# Patient Record
Sex: MALE | Race: WHITE | NOT HISPANIC OR LATINO | Employment: FULL TIME | URBAN - NONMETROPOLITAN AREA
[De-identification: names, ages, dates, MRNs, and addresses within clinical notes are randomized per-mention and may not be internally consistent; named-entity substitution may affect disease eponyms.]

---

## 2023-11-02 ENCOUNTER — APPOINTMENT (OUTPATIENT)
Dept: RADIOLOGY | Facility: HOSPITAL | Age: 63
End: 2023-11-02
Payer: COMMERCIAL

## 2023-11-02 ENCOUNTER — LAB (OUTPATIENT)
Dept: LAB | Facility: LAB | Age: 63
End: 2023-11-02
Payer: COMMERCIAL

## 2023-11-02 ENCOUNTER — HOSPITAL ENCOUNTER (EMERGENCY)
Facility: HOSPITAL | Age: 63
Discharge: HOME | End: 2023-11-02
Attending: FAMILY MEDICINE
Payer: COMMERCIAL

## 2023-11-02 VITALS
TEMPERATURE: 97.2 F | WEIGHT: 209.88 LBS | HEIGHT: 61 IN | SYSTOLIC BLOOD PRESSURE: 137 MMHG | BODY MASS INDEX: 39.63 KG/M2 | OXYGEN SATURATION: 99 % | HEART RATE: 65 BPM | DIASTOLIC BLOOD PRESSURE: 74 MMHG | RESPIRATION RATE: 14 BRPM

## 2023-11-02 DIAGNOSIS — R07.9 CHEST PAIN, UNSPECIFIED TYPE: Primary | ICD-10-CM

## 2023-11-02 DIAGNOSIS — R10.9 ABDOMINAL PAIN, UNSPECIFIED ABDOMINAL LOCATION: ICD-10-CM

## 2023-11-02 DIAGNOSIS — R10.13 EPIGASTRIC PAIN: Primary | ICD-10-CM

## 2023-11-02 DIAGNOSIS — R91.8 LUNG INFILTRATE: ICD-10-CM

## 2023-11-02 DIAGNOSIS — K85.90 ACUTE PANCREATITIS WITHOUT NECROSIS OR INFECTION, UNSPECIFIED (HHS-HCC): ICD-10-CM

## 2023-11-02 DIAGNOSIS — M54.14 RADICULOPATHY, THORACIC REGION: ICD-10-CM

## 2023-11-02 LAB
ALBUMIN SERPL BCP-MCNC: 3.9 G/DL (ref 3.4–5)
ALP SERPL-CCNC: 68 U/L (ref 33–136)
ALT SERPL W P-5'-P-CCNC: 15 U/L (ref 10–52)
AMYLASE SERPL-CCNC: 47 U/L (ref 29–103)
ANION GAP SERPL CALC-SCNC: 12 MMOL/L (ref 10–20)
AST SERPL W P-5'-P-CCNC: 19 U/L (ref 9–39)
BASOPHILS # BLD AUTO: 0.05 X10*3/UL (ref 0–0.1)
BASOPHILS # BLD AUTO: 0.05 X10*3/UL (ref 0–0.1)
BASOPHILS NFR BLD AUTO: 1 %
BASOPHILS NFR BLD AUTO: 1.1 %
BILIRUB SERPL-MCNC: 0.4 MG/DL (ref 0–1.2)
BUN SERPL-MCNC: 15 MG/DL (ref 6–23)
CALCIUM SERPL-MCNC: 9 MG/DL (ref 8.6–10.3)
CARDIAC TROPONIN I PNL SERPL HS: 3 NG/L (ref 0–20)
CARDIAC TROPONIN I PNL SERPL HS: 3 NG/L (ref 0–20)
CHLORIDE SERPL-SCNC: 103 MMOL/L (ref 98–107)
CO2 SERPL-SCNC: 28 MMOL/L (ref 21–32)
CREAT SERPL-MCNC: 0.79 MG/DL (ref 0.5–1.3)
CRP SERPL-MCNC: <0.1 MG/DL
EOSINOPHIL # BLD AUTO: 0.17 X10*3/UL (ref 0–0.7)
EOSINOPHIL # BLD AUTO: 0.25 X10*3/UL (ref 0–0.7)
EOSINOPHIL NFR BLD AUTO: 3.9 %
EOSINOPHIL NFR BLD AUTO: 5.2 %
ERYTHROCYTE [DISTWIDTH] IN BLOOD BY AUTOMATED COUNT: 12.6 % (ref 11.5–14.5)
ERYTHROCYTE [DISTWIDTH] IN BLOOD BY AUTOMATED COUNT: 12.7 % (ref 11.5–14.5)
ERYTHROCYTE [SEDIMENTATION RATE] IN BLOOD BY WESTERGREN METHOD: 3 MM/H (ref 0–20)
GFR SERPL CREATININE-BSD FRML MDRD: >90 ML/MIN/1.73M*2
GLUCOSE SERPL-MCNC: 105 MG/DL (ref 74–99)
HCT VFR BLD AUTO: 41.6 % (ref 41–52)
HCT VFR BLD AUTO: 43.9 % (ref 41–52)
HGB BLD-MCNC: 13.6 G/DL (ref 13.5–17.5)
HGB BLD-MCNC: 14.2 G/DL (ref 13.5–17.5)
IMM GRANULOCYTES # BLD AUTO: 0.01 X10*3/UL (ref 0–0.7)
IMM GRANULOCYTES # BLD AUTO: 0.01 X10*3/UL (ref 0–0.7)
IMM GRANULOCYTES NFR BLD AUTO: 0.2 % (ref 0–0.9)
IMM GRANULOCYTES NFR BLD AUTO: 0.2 % (ref 0–0.9)
LIPASE SERPL-CCNC: 20 U/L (ref 9–82)
LYMPHOCYTES # BLD AUTO: 0.86 X10*3/UL (ref 1.2–4.8)
LYMPHOCYTES # BLD AUTO: 1.16 X10*3/UL (ref 1.2–4.8)
LYMPHOCYTES NFR BLD AUTO: 19.6 %
LYMPHOCYTES NFR BLD AUTO: 23.9 %
MAGNESIUM SERPL-MCNC: 2.18 MG/DL (ref 1.6–2.4)
MCH RBC QN AUTO: 31.5 PG (ref 26–34)
MCH RBC QN AUTO: 31.6 PG (ref 26–34)
MCHC RBC AUTO-ENTMCNC: 32.3 G/DL (ref 32–36)
MCHC RBC AUTO-ENTMCNC: 32.7 G/DL (ref 32–36)
MCV RBC AUTO: 96 FL (ref 80–100)
MCV RBC AUTO: 98 FL (ref 80–100)
MONOCYTES # BLD AUTO: 0.69 X10*3/UL (ref 0.1–1)
MONOCYTES # BLD AUTO: 0.74 X10*3/UL (ref 0.1–1)
MONOCYTES NFR BLD AUTO: 14.2 %
MONOCYTES NFR BLD AUTO: 16.9 %
NEUTROPHILS # BLD AUTO: 2.56 X10*3/UL (ref 1.2–7.7)
NEUTROPHILS # BLD AUTO: 2.69 X10*3/UL (ref 1.2–7.7)
NEUTROPHILS NFR BLD AUTO: 55.5 %
NEUTROPHILS NFR BLD AUTO: 58.3 %
NRBC BLD-RTO: 0 /100 WBCS (ref 0–0)
NRBC BLD-RTO: 0 /100 WBCS (ref 0–0)
PLATELET # BLD AUTO: 223 X10*3/UL (ref 150–450)
PLATELET # BLD AUTO: 239 X10*3/UL (ref 150–450)
POTASSIUM SERPL-SCNC: 4.1 MMOL/L (ref 3.5–5.3)
PROT SERPL-MCNC: 6.6 G/DL (ref 6.4–8.2)
RBC # BLD AUTO: 4.32 X10*6/UL (ref 4.5–5.9)
RBC # BLD AUTO: 4.5 X10*6/UL (ref 4.5–5.9)
SODIUM SERPL-SCNC: 139 MMOL/L (ref 136–145)
WBC # BLD AUTO: 4.4 X10*3/UL (ref 4.4–11.3)
WBC # BLD AUTO: 4.9 X10*3/UL (ref 4.4–11.3)

## 2023-11-02 PROCEDURE — 82150 ASSAY OF AMYLASE: CPT

## 2023-11-02 PROCEDURE — 71275 CT ANGIOGRAPHY CHEST: CPT

## 2023-11-02 PROCEDURE — 96375 TX/PRO/DX INJ NEW DRUG ADDON: CPT

## 2023-11-02 PROCEDURE — 96365 THER/PROPH/DIAG IV INF INIT: CPT

## 2023-11-02 PROCEDURE — 84484 ASSAY OF TROPONIN QUANT: CPT | Performed by: FAMILY MEDICINE

## 2023-11-02 PROCEDURE — 71275 CT ANGIOGRAPHY CHEST: CPT | Performed by: SURGERY

## 2023-11-02 PROCEDURE — 87040 BLOOD CULTURE FOR BACTERIA: CPT | Mod: GENLAB | Performed by: FAMILY MEDICINE

## 2023-11-02 PROCEDURE — 74177 CT ABD & PELVIS W/CONTRAST: CPT | Performed by: SURGERY

## 2023-11-02 PROCEDURE — 83690 ASSAY OF LIPASE: CPT

## 2023-11-02 PROCEDURE — C9113 INJ PANTOPRAZOLE SODIUM, VIA: HCPCS | Performed by: FAMILY MEDICINE

## 2023-11-02 PROCEDURE — 85652 RBC SED RATE AUTOMATED: CPT

## 2023-11-02 PROCEDURE — 74177 CT ABD & PELVIS W/CONTRAST: CPT

## 2023-11-02 PROCEDURE — 85025 COMPLETE CBC W/AUTO DIFF WBC: CPT

## 2023-11-02 PROCEDURE — 36415 COLL VENOUS BLD VENIPUNCTURE: CPT | Performed by: FAMILY MEDICINE

## 2023-11-02 PROCEDURE — 80053 COMPREHEN METABOLIC PANEL: CPT | Performed by: FAMILY MEDICINE

## 2023-11-02 PROCEDURE — 86140 C-REACTIVE PROTEIN: CPT

## 2023-11-02 PROCEDURE — 99285 EMERGENCY DEPT VISIT HI MDM: CPT | Mod: 25 | Performed by: FAMILY MEDICINE

## 2023-11-02 PROCEDURE — 85025 COMPLETE CBC W/AUTO DIFF WBC: CPT | Performed by: FAMILY MEDICINE

## 2023-11-02 PROCEDURE — 2550000001 HC RX 255 CONTRASTS: Performed by: FAMILY MEDICINE

## 2023-11-02 PROCEDURE — 2500000001 HC RX 250 WO HCPCS SELF ADMINISTERED DRUGS (ALT 637 FOR MEDICARE OP): Performed by: FAMILY MEDICINE

## 2023-11-02 PROCEDURE — 99285 EMERGENCY DEPT VISIT HI MDM: CPT | Mod: 25

## 2023-11-02 PROCEDURE — 87075 CULTR BACTERIA EXCEPT BLOOD: CPT | Mod: GENLAB | Performed by: FAMILY MEDICINE

## 2023-11-02 PROCEDURE — 83735 ASSAY OF MAGNESIUM: CPT | Performed by: FAMILY MEDICINE

## 2023-11-02 PROCEDURE — 2500000004 HC RX 250 GENERAL PHARMACY W/ HCPCS (ALT 636 FOR OP/ED): Performed by: FAMILY MEDICINE

## 2023-11-02 RX ORDER — ALUMINUM HYDROXIDE, MAGNESIUM HYDROXIDE, AND SIMETHICONE 1200; 120; 1200 MG/30ML; MG/30ML; MG/30ML
30 SUSPENSION ORAL ONCE
Status: COMPLETED | OUTPATIENT
Start: 2023-11-02 | End: 2023-11-02

## 2023-11-02 RX ORDER — AMOXICILLIN AND CLAVULANATE POTASSIUM 875; 125 MG/1; MG/1
1 TABLET, FILM COATED ORAL EVERY 12 HOURS SCHEDULED
Status: DISCONTINUED | OUTPATIENT
Start: 2023-11-02 | End: 2023-11-02 | Stop reason: HOSPADM

## 2023-11-02 RX ORDER — PANTOPRAZOLE SODIUM 40 MG/1
40 TABLET, DELAYED RELEASE ORAL
Qty: 30 TABLET | Refills: 0 | Status: SHIPPED | OUTPATIENT
Start: 2023-11-02 | End: 2024-11-01

## 2023-11-02 RX ORDER — LIDOCAINE HYDROCHLORIDE 20 MG/ML
1.25 SOLUTION OROPHARYNGEAL ONCE
Status: COMPLETED | OUTPATIENT
Start: 2023-11-02 | End: 2023-11-02

## 2023-11-02 RX ORDER — AMOXICILLIN AND CLAVULANATE POTASSIUM 875; 125 MG/1; MG/1
1 TABLET, FILM COATED ORAL ONCE
Status: DISCONTINUED | OUTPATIENT
Start: 2023-11-02 | End: 2023-11-02

## 2023-11-02 RX ORDER — PANTOPRAZOLE SODIUM 40 MG/10ML
40 INJECTION, POWDER, LYOPHILIZED, FOR SOLUTION INTRAVENOUS ONCE
Status: COMPLETED | OUTPATIENT
Start: 2023-11-02 | End: 2023-11-02

## 2023-11-02 RX ORDER — AMOXICILLIN AND CLAVULANATE POTASSIUM 875; 125 MG/1; MG/1
1 TABLET, FILM COATED ORAL EVERY 12 HOURS
Qty: 20 TABLET | Refills: 0 | Status: SHIPPED | OUTPATIENT
Start: 2023-11-02 | End: 2023-11-12

## 2023-11-02 RX ORDER — PANTOPRAZOLE SODIUM 40 MG/10ML
40 INJECTION, POWDER, LYOPHILIZED, FOR SOLUTION INTRAVENOUS ONCE
Status: DISCONTINUED | OUTPATIENT
Start: 2023-11-02 | End: 2023-11-02 | Stop reason: HOSPADM

## 2023-11-02 RX ADMIN — PIPERACILLIN SODIUM AND TAZOBACTAM SODIUM 3.38 G: 3; .375 INJECTION, SOLUTION INTRAVENOUS at 04:34

## 2023-11-02 RX ADMIN — IOHEXOL 75 ML: 350 INJECTION, SOLUTION INTRAVENOUS at 03:24

## 2023-11-02 RX ADMIN — PANTOPRAZOLE SODIUM 40 MG: 40 INJECTION, POWDER, FOR SOLUTION INTRAVENOUS at 04:39

## 2023-11-02 RX ADMIN — ALUMINUM HYDROXIDE, MAGNESIUM HYDROXIDE, AND SIMETHICONE 30 ML: 200; 200; 20 SUSPENSION ORAL at 05:18

## 2023-11-02 RX ADMIN — LIDOCAINE HYDROCHLORIDE 1.25 ML: 20 SOLUTION ORAL; TOPICAL at 04:40

## 2023-11-02 ASSESSMENT — PAIN SCALES - GENERAL
PAINLEVEL_OUTOF10: 4
PAINLEVEL_OUTOF10: 4

## 2023-11-02 ASSESSMENT — COLUMBIA-SUICIDE SEVERITY RATING SCALE - C-SSRS
6. HAVE YOU EVER DONE ANYTHING, STARTED TO DO ANYTHING, OR PREPARED TO DO ANYTHING TO END YOUR LIFE?: NO
2. HAVE YOU ACTUALLY HAD ANY THOUGHTS OF KILLING YOURSELF?: NO
1. IN THE PAST MONTH, HAVE YOU WISHED YOU WERE DEAD OR WISHED YOU COULD GO TO SLEEP AND NOT WAKE UP?: NO

## 2023-11-02 ASSESSMENT — PAIN - FUNCTIONAL ASSESSMENT: PAIN_FUNCTIONAL_ASSESSMENT: 0-10

## 2023-11-02 ASSESSMENT — PAIN DESCRIPTION - PAIN TYPE: TYPE: ACUTE PAIN

## 2023-11-02 ASSESSMENT — PAIN DESCRIPTION - LOCATION: LOCATION: CHEST

## 2023-11-02 ASSESSMENT — PAIN DESCRIPTION - DESCRIPTORS: DESCRIPTORS: PRESSURE

## 2023-11-02 NOTE — ED NOTES
Discharge instructions and follow up information provided to pt, given work note, prescriptions send to [facility] in [place], IV removed, ambulatory out of ED

## 2023-11-02 NOTE — ED NOTES
Repeat EKG and trop done on pt, pt has no needs at this time, call light is within reach and family is at bedside     Harini Giraldo RN  11/02/23 [id number]

## 2023-11-02 NOTE — ED PROVIDER NOTES
HPI   Chief Complaint   Patient presents with   • Chest Pain     Pt to ED c/o midsternal chest pain 4/10 that radiates to left arm since, worsening since Sunday       HPI  63-year-old male patient history of rectal cancer diagnosed about 2 years ago is cancer free and he has no colostomy tube exit site experiencing epigastric pain but he also complaining of substernal and above the breast area pain and some numbness in axillary area and decided come to ER for evaluation symptoms started 4 days ago.  He denies any course or dizzy and lightheaded or palpitation.  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  He denies any vomiting vomiting blood blood in stool or black stool.  He denies any pain in arms or jaw.  Denies any cold sweats.  Denies any pain or swelling legs, recent travel surgery or hospitalization.  He has never been scoped and denies history of peptic ulcer disease.  He still have gallbladder and appendix.  Denies UTI symptoms.  Denies personal family history of AAA or dissection.  Patient also denies personal history of coronary artery disease.  Denies weight loss weight gain or night sweats he is being evaluated and advised by his cancer specialist scheduled to have surveillance CT of the abdomen pelvis in December.       REVIEW OF SYSTEMS    CONSTITUTIONAL: Negative for: chills and fever  EYES: Negative for: pain   ENMTEars: Negative for: hearing disturbance and tinnitus  Nose: Negative for: congestion and nose bleeds  CARDIOVASCULAR: Chest pain and epigastric pain,no chest tightness cough wheezing congestion reported.)   Negative for: bradycardia, chest pain, diaphoresis, edema, irregular rhythm and palpitations  RESPIRATORY: Negative for: cough, dyspnea and wheezing  GASTROINTESTINAL: Epigastric pain no nausea, vomiting vomiting blood blood in stool or black stool also complaining of chest pain.      GENITOURINARY:  Negative for: dysuria, frequency, hematuria and strong smelling urine; MUSCULOSKELETAL: Negative for: back pain, neck pain, sensory deficits and stiffness  NEUROLOGICAL: Negative for: altered mental status, dizziness, headache, loss of consciousness, loss of function and low extremity numbness; memory impairment, neck stiffness, sensory deficits, upper extremity numbness and vertigo  PSYCHIATRIC: Negative for: anxiety, depression and hallucinations    HEME/LYMPH: Negative for: anemia and night sweats         PHYSICAL EXAM    CONSTITUTIONAL: Patient is awake alert pleasant and cooperative did not look toxic in distress.. Breathing comfortably oriented times three. Patient noted a pulse ox in the mid to high 90s after going out to, heart rate in the 60s.  No friction rub no murmur no JVD.  No peripheral edema.     HENMT: The airway was intact. There was no ear or nose discharge. No drooling or stridor. Neck was supple. Talking and breathing comfortably.      EYES: Clear bilaterally, pupils equal, round and reactive to light.     CARDIOVASCULAR: Regular rate and rhythm. No friction rub or murmur good peripheral pulses no peripheral edema. Nontender Homans sign negative.     RESPIRATORY: Patient was breathing comfortably. No tachypnea no respiratory distress.  On auscultation he has diminished breath sounds crackles in the lung bases.  However has good skin perfusion.     GASTROINTESTINAL: Abdomen soft and non-distended, no palpable mass noted,Noted to have some epigastric tenderness,no guarding rebound or rigidity no CVA tenderness noted no organomegaly. Careful examination demonstrated no     GENITOURINARY:  No costovertebral angle tenderness.     MUSCULOSKELETAL: Head was normocephalic atraumatic cervical thoracic lumbar spine nontender.  Chest was nontender  Both upper extremity good motion nontender intact distal pulse intact sensation.     NEUROLOGICAL: Awake alert pleasant and cooperative. No motor or sensory  deficit no arms selective noted. Intact neurovascular function and motor function. No facial drooping or drooling. Talking and breathing comfortably.  Cranial nerves II to XII grossly intact. No arms selective noted. No nystagmus.      SKIN: Skin normal color for race, warm, dry and intact. No evidence of trauma or lesions.     PSYCHIATRIC: Awake alert and without acute distress. No obvious depression, no suicidal thoughts or ideation. Appropriate mood. Talking and normal tone.     HEME/LYMPH: No adenopathy or splenomegaly.        CRITICAL CARE    VITAL SIGNS:       heart rate 60 respiratory 19 blood pressure 152/71 pulse ox 92% on O2 through nasal cannula           DISPOSITION    Due to his symptoms present 4 days ago he decided come to the ER this morning just before 3:00 in the morning.  Patient has a small does not drink denies substance abuse.  He is a schoolteacher.    Review of system: 10 review of system obtained, all negative except as described in HPI               Carolyne Coma Scale Score: 15                  Patient History   Past Medical History:   Diagnosis Date   • Encounter for attention to ileostomy (CMS/HCC) 07/17/2020    Attention to ileostomy   • Other conditions influencing health status     No significant past surgical history   • Other retention of urine 03/11/2020    Acute urinary retention   • Other specified diseases of anus and rectum 07/17/2020    Rectal mass   • Retention of urine, unspecified 03/13/2020    Incomplete bladder emptying     Past Surgical History:   Procedure Laterality Date   • OTHER SURGICAL HISTORY  07/17/2020    Ileostomy closure   • US GUIDED NEEDLE LIVER BIOPSY  10/24/2019    US GUIDED NEEDLE LIVER BIOPSY 10/24/2019 [facility]GAC[facility]     No family history on file.  Social History     Tobacco Use   • Smoking status: Not on file   • Smokeless tobacco: Not on file   Substance Use Topics   • Alcohol use: Not on file   • Drug use: Not on file       Physical Exam   ED Triage  Vitals [11/02/23 0229]   Temp Heart Rate Resp BP   36.2 °C (97.2 °F) 68 14 154/80      SpO2 Temp src Heart Rate Source Patient Position   97 % -- -- --      BP Location FiO2 (%)     -- --       Physical Exam    ED Course & MDM   Diagnoses as of 11/02/23 050   Chest pain, unspecified type   Abdominal pain, unspecified abdominal location   Lung infiltrate       Medical Decision Making      Procedure  Procedures     Viki Mota MD  11/02/23 [id number]

## 2023-11-02 NOTE — DISCHARGE INSTRUCTIONS
As discussed your blood test for the heart attack was negative there was no evidence of blood clot in the lung or lung mass however you do have infiltrate in the lung.  Needs to be treated and make sure it is resolved need to have follow-up to ensure resolution.  There is no acute intra-abdominal abnormality there is anastomosis related finding similar to 1 you had on previous CAT scan that your colon or colorectal cancer needs to be reevaluated but is not what it was seen on prior CAT scan.  You also had epigastric pain and he would benefit from gastroenterology evaluation initially you been referred to cardiologist and primary care physician.  If any recurrent chest pain short of breath new symptoms worsening symptoms return to ER.  Remember that chest PTH does not show evidence of heart attack, coronary artery disease cannot be entirely excluded on blood test and EKG basis.  You need you follow-up with cardiologist for reevaluation.

## 2023-11-02 NOTE — ED NOTES
Pt to ED c/o midsternal chest pain 4/10 that radiates to left arm since, worsening since Sunday, EKG performed, NSR, IV and blood work completed     Harini Giraldo, RN  11/02/23 8508

## 2023-11-06 LAB
BACTERIA BLD CULT: NORMAL
BACTERIA BLD CULT: NORMAL

## 2023-12-05 ENCOUNTER — HOSPITAL ENCOUNTER (OUTPATIENT)
Dept: CARDIOLOGY | Facility: HOSPITAL | Age: 63
Discharge: HOME | End: 2023-12-05
Payer: COMMERCIAL

## 2023-12-05 PROCEDURE — 93005 ELECTROCARDIOGRAM TRACING: CPT

## 2023-12-06 LAB
ATRIAL RATE: 58 BPM
ATRIAL RATE: 70 BPM
P AXIS: 77 DEGREES
P AXIS: 80 DEGREES
P OFFSET: 195 MS
P OFFSET: 195 MS
P ONSET: 127 MS
P ONSET: 128 MS
PR INTERVAL: 190 MS
PR INTERVAL: 192 MS
Q ONSET: 223 MS
Q ONSET: 223 MS
QRS COUNT: 10 BEATS
QRS COUNT: 11 BEATS
QRS DURATION: 88 MS
QRS DURATION: 92 MS
QT INTERVAL: 396 MS
QT INTERVAL: 422 MS
QTC CALCULATION(BAZETT): 414 MS
QTC CALCULATION(BAZETT): 427 MS
QTC FREDERICIA: 417 MS
QTC FREDERICIA: 417 MS
R AXIS: 38 DEGREES
R AXIS: 43 DEGREES
T AXIS: 53 DEGREES
T AXIS: 55 DEGREES
T OFFSET: 421 MS
T OFFSET: 434 MS
VENTRICULAR RATE: 58 BPM
VENTRICULAR RATE: 70 BPM

## 2023-12-20 ENCOUNTER — LAB (OUTPATIENT)
Dept: LAB | Facility: HOSPITAL | Age: 63
End: 2023-12-20
Payer: COMMERCIAL

## 2023-12-20 ENCOUNTER — OFFICE VISIT (OUTPATIENT)
Dept: HEMATOLOGY/ONCOLOGY | Facility: HOSPITAL | Age: 63
End: 2023-12-20
Payer: COMMERCIAL

## 2023-12-20 ENCOUNTER — APPOINTMENT (OUTPATIENT)
Dept: HEMATOLOGY/ONCOLOGY | Facility: HOSPITAL | Age: 63
End: 2023-12-20
Payer: COMMERCIAL

## 2023-12-20 VITALS
WEIGHT: 202.16 LBS | HEIGHT: 70 IN | OXYGEN SATURATION: 100 % | DIASTOLIC BLOOD PRESSURE: 64 MMHG | TEMPERATURE: 97.2 F | RESPIRATION RATE: 18 BRPM | BODY MASS INDEX: 28.94 KG/M2 | SYSTOLIC BLOOD PRESSURE: 138 MMHG | HEART RATE: 70 BPM

## 2023-12-20 DIAGNOSIS — C20 RECTAL CANCER (MULTI): Primary | ICD-10-CM

## 2023-12-20 DIAGNOSIS — C20 RECTAL CANCER (MULTI): ICD-10-CM

## 2023-12-20 LAB
HOLD SPECIMEN: NORMAL
HOLD SPECIMEN: NORMAL

## 2023-12-20 PROCEDURE — 99215 OFFICE O/P EST HI 40 MIN: CPT | Performed by: INTERNAL MEDICINE

## 2023-12-20 PROCEDURE — 1159F MED LIST DOCD IN RCRD: CPT | Performed by: INTERNAL MEDICINE

## 2023-12-20 PROCEDURE — 1126F AMNT PAIN NOTED NONE PRSNT: CPT | Performed by: INTERNAL MEDICINE

## 2023-12-20 PROCEDURE — 36415 COLL VENOUS BLD VENIPUNCTURE: CPT

## 2023-12-20 PROCEDURE — 1036F TOBACCO NON-USER: CPT | Performed by: INTERNAL MEDICINE

## 2023-12-20 ASSESSMENT — PAIN SCALES - GENERAL: PAINLEVEL: 0-NO PAIN

## 2023-12-20 NOTE — PROGRESS NOTES
Patient ID: Jonel Rai is a 63 y.o. male.  Referring Physician: No referring provider defined for this encounter.  Primary Care Provider: Korey Hubbard DO     To summarize his cancer Hx:  He presented to the emergency room because of  difficulty urinating and imaging on 05/22/2019 revealed an apple core circumferential rectal  mass with MRI pelvis on 05/24/2019 confirmed T4a N2 rectal cancer, extending into the sigmoid colon and into the perirectal fat with adenopathy.  05/24/2019  Chest CT showed 5 mm groundglass nodule in the right upper lobe of the lung and no other definitive evidence of metastatic disease. This was followed by Flexible sigmoidoscopy revealed an ulcerated rectal mass and biopsy revealed invasive adenocarcinoma  grade 3, pMMR.  Patient developed abdominal pain and decreased appetite and evaluation revealed obstruction therefore underwent a laparoscopic sigmoid loop colostomy. CEA on 05/2019 was 10.3. The case has been discussed on the tumor board on 06/06/2019  and pt was referred to Med Onc and Rad Onc. After that the pt saw Matt Andrew who repeated MRI on 07/23/2019 showed mid rectal mass extending above and below the anterior peritoneal reflection demonstrating invasion into the seminal vesicles bilaterally.  Dr. Vallejo discussed with the pt the stage and the extent of the disease with agreement to proceed with PATRICK (Total Neoadjuvant therapy) with Chemotherapy using XELOX (oxaliplatin and Xeloda) for 12-16 weeks then followed by concurrent Xeloda and radiation  therapy, and then surgical resection. Pt understudied the plan and he started C1 on 08/06/2019 and tolerated well. He also started ChemoRT on 11/18/2019 and tolerating well. Repeated MRI on 01/2020  for response evaluation showed Significant response to treatment compared to both prior MRI of the pelvis, most recent 10/04/2019, with interval improvement and decrease of the transmural circumferential upper rectal  "mass and the LNS. On 02/27/2020 he  had Robotic low anterior resection with flexible sigmoidoscopy, loop ileostomy, colostomy reversal and Robotic dissection of the seminal vesicles and mobilization of the bladder. Final surgical pathology didn't show any residual carcinoma with final staging  T0N0 with 0/26 LNS were involved.    Subjective    Patient is feeling well. Denies abdominal pain, nausea or vomiting. No new symptoms.        Objective   BSA: 2.13 meters squared  /64 (BP Location: Left arm, Patient Position: Sitting, BP Cuff Size: Adult)   Pulse 70   Temp 36.2 °C (97.2 °F) (Temporal)   Resp 18   Ht (S) 1.786 m (5' 10.32\")   Wt 91.7 kg (202 lb 2.6 oz)   SpO2 100%   BMI 28.75 kg/m²       Jonel Rai  reports that he has never smoked. He has never used smokeless tobacco.  He  has no history on file for alcohol use.  He  has no history on file for drug use.    ROS:  All 14 systems have been reviewed and were negative except for the HPI.     Physical Exam:  General: Appears well and in NAD  Eyes: PERRL, EOMI, clear sclera  ENMT: mucous membranes moist, no apparent injury, no lesions seen  Head/Neck: Neck supple, no apparent injury, thyroid without mass or tenderness, No JVD, trachea midline,   Thorax: Patent airways, CTAB, normal breath sounds with good chest expansion, thorax symmetric  Cardiovascular: Regular, rate and rhythm, no murmurs, 2+ equal pulses of the extremities, normal S 1and S 2  Gastrointestinal: Nondistended, soft, non-tender, no rebound tenderness or guarding, no masses palpable, no organomegaly, +BS  Musculoskeletal: ROM intact, no joint swelling, normal strength  Extremities: normal extremities, no cyanosis edema, contusions or wounds, no clubbing  Neurological: alert and oriented x3, intact senses, motor, response and reflexes, normal strength  Lymphatic: No significant lymphadenopathy  Psychological: Appropriate mood and behavior  Skin: Warm and dry, no lesions, no rashes "     Performance Status:  Asymptomatic    Lab Results:  I have reviewed these laboratory results:     Lab Results   Component Value Date    WBC 4.4 11/02/2023    HGB 14.2 11/02/2023    HCT 43.9 11/02/2023    MCV 98 11/02/2023     11/02/2023         Chemistry    Lab Results   Component Value Date/Time     11/02/2023 0228    K 4.1 11/02/2023 0228     11/02/2023 0228    CO2 28 11/02/2023 0228    BUN 15 11/02/2023 0228    CREATININE 0.79 11/02/2023 0228    Lab Results   Component Value Date/Time    CALCIUM 9.0 11/02/2023 0228    ALKPHOS 68 11/02/2023 0228    AST 19 11/02/2023 0228    ALT 15 11/02/2023 0228    BILITOT 0.4 11/02/2023 0228            Lab Results   Component Value Date    TSH 3.77 01/31/2020        Radiology Result:  I have reviewed the latest Imaging in PACS and the findings are noted in this note. I discussed the results of the latest imaging with the patient. All previous imaging were reviewed at the time it was completed. Full records are available in the EMR for review as well.     === 11/02/23 ===    CT ABDOMEN PELVIS W IV CONTRAST    - Impression -  No evidence of acute abdominal or pelvic pathology.    Moderate to large volume of colonic stool; correlate with  constipation.    Redemonstrated postsurgical changes to the colon, with similar  appearance of abnormal extraluminal density along the left lateral  aspect of the colon proximal to the proximal aspect of the  anastomotic suture line in the pelvis.    Additional findings as discussed above.      MACRO:  None    Signed by: Bernardo Aceves 11/2/2023 3:41 AM  Dictation workstation:   [id number]    === 01/27/20 ===    MRI PELVIS W WO CONTRAST    - Impression -  1. Significant response to treatment compared to both prior MRI of  the pelvis, most recent 10/04/2019, with interval improvement and  decrease of the transmural circumferential upper rectal mass with  however persistent viable tumor in a segment of approximately 3cm  length,  persistent component of extramural invasion and concern for  persistent involvement (at least microscopic) of the peritoneal  reflection.  2. Interval decrease in size and number of at least 4 superior rectal  and perirectal lymphadenopathy compared to 10/04/2019 MRI, compatible  with stage N2.    I personally reviewed the images/study and I agree with the findings  as stated. This study was interpreted at [facility], [place], [place].           Pathology Results:  I have reviewed the full pathology report recorded in the EMR. The pertinent portions indicating diagnosis are listed here in the note. for details please refer to the full report recorded in the EMR.    Assessment and Plan:   Stage IIIC (T4aN2)  rectal cancer   60-year-old man with an initial clinical T4aN2 stage IIIc rectal cancer  S/P PATRICK  PS: ECOG score of 0  CEA was 10.3 on 05/2019 and was trending up to 38 after the colostomy  Treatment up-to-date:   PATRICK (Total Neoadjuvant  therapy) with Chemotherapy using XELOX (oxaliplatin and Xeloda) for 12-16 weeks then followed by concurrent Xeloda and radiation therapy, and then surgical resection. Pt understudied the plan and he started C1 on 08/06/2019 and he tolerated well and had  his last cycle was on 10/2019.   Biopsy of the Liver lesion on the restaging scan was negative.  He also started ChemoRT on 11/18/2019 and finished on 12/2019 tolerating well.  Repeated MRI on 01/2020 for response evaluation showed Significant response to treatment compared to both prior MRI of the pelvis, most recent 10/04/2019, with interval improvement and decrease of the transmural circumferential upper rectal mass and the  LNS. On 02/27/2020 he had Robotic low anterior resection with flexible sigmoidoscopy, loop ileostomy, colostomy reversal and Robotic dissection of the seminal vesicles and mobilization of the bladder. Final surgical pathology didn't show any residual  carcinoma with final  staging T0N0 with 0/26 LNS were involved.  Last CEA is  0.8  CT CAP for surveillance showed no evidence of metastatic or recurrent disease. The lung changes due to his COVID infection   He had his Year 1 colonscopy on 02/26/2020 which was normal   11/02/2023: CT CAP was negative  No CEA or CtDNA      Today :  Pt is feeling well today.   Denies any abdominal pain, no fever, no chills, no nausea, no vomitting. No fatigue. No melena or hematocezia. Appetite is okay   Will resume with annual labs , CEA, CtDNA, CTCAP and colonscopy for another 2 years to complete the 5 years      His surveillance for total of 5 yrs. I will resume with H&P with labs include CEA every 3 mos for 2 yrs then every 6 mos for another 3 yrs to complete total of 5 yrs. Interms of imaging I will proceed with CT CAP every 6 mos for 2 yrs then annually for  another 3 yrs to complete total of 5 yrs. For colonoscopy we will have the plan for 1,3,&5 yrs colonscopy for follow up.                       Plan:    1-Resume surveillance in 12  mos CEA and CT CAP in 12/2023 for another 3 years   2-Will schedule colonoscopy at year 3 in 03/2023  3-RTC in 12 months                 Jonel MONTERROSO Cecelia ,  It was a pleasure talking to you today.    Our offices will be reaching out to you to make all the appointments. I am always available at the phone numbers listed below for an earlier appointment should you wish one.    In case of an emergency please dial 911 or report to your nearest Emergency Room.  For all other questions, please do not hesitate to reach out to us at the number listed below.    Thank you for choosing [facility] at [facility].   We appreciate your visit.       Ron Robertson M.D.   and Director of the Neuroendocrine Tumor Program  Gastrointestinal Medical Oncology  Department of Hematology and Oncology  [facility], [address],  [place] [id number]  Phone (Office): [phone]  Fax:  [phone]   Email: [email or web address]  Learn more about [facility] Comprehensive Neuroendocrine Tumor Program: Click Here  Learn more about [place] [employer]end[employer]: [email or web address]

## 2023-12-20 NOTE — PROGRESS NOTES
Patient here with wife. Will return in 1 yr for follow up. Signatera done today in lab, will repeat in 1 yr.

## 2024-10-25 ENCOUNTER — OFFICE VISIT (OUTPATIENT)
Dept: URGENT CARE | Facility: URGENT CARE | Age: 64
End: 2024-10-25
Payer: COMMERCIAL

## 2024-10-25 VITALS
HEART RATE: 73 BPM | BODY MASS INDEX: 29.06 KG/M2 | TEMPERATURE: 98.3 F | SYSTOLIC BLOOD PRESSURE: 131 MMHG | OXYGEN SATURATION: 95 % | RESPIRATION RATE: 18 BRPM | WEIGHT: 204.37 LBS | DIASTOLIC BLOOD PRESSURE: 81 MMHG

## 2024-10-25 DIAGNOSIS — L30.9 DERMATITIS: ICD-10-CM

## 2024-10-25 DIAGNOSIS — S30.860A TICK BITE OF LOWER BACK, INITIAL ENCOUNTER: Primary | ICD-10-CM
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
## 2024-10-25 RX ORDER — CLOTRIMAZOLE AND BETAMETHASONE DIPROPIONATE 10; .64 MG/G; MG/G
1 CREAM TOPICAL 2 TIMES DAILY
Qty: 15 G | Refills: 0 | Status: SHIPPED | OUTPATIENT
Start: 2024-10-25 | End: 2024-11-01

## 2024-10-25 RX ORDER — DOXYCYCLINE HYCLATE 100 MG
100 TABLET ORAL 2 TIMES DAILY
Qty: 20 TABLET | Refills: 0 | Status: SHIPPED | OUTPATIENT
Start: 2024-10-25 | End: 2024-11-04

## 2024-10-25 ASSESSMENT — PAIN SCALES - GENERAL: PAINLEVEL_OUTOF10: 0-NO PAIN

## 2024-10-25 NOTE — PATIENT INSTRUCTIONS
Tick Bite- Apply bacitracin ointment to tick bite twice a day x 3-5 days, may take tylenol as needed for pain. May contact local health department to send tick for testing of lyme disease.    Dermatitis with tinea infection- start Clotrimazole-betamethasone cream to right ankle and upper abdomen twice a day x 7 days, if not improving follow up with PCP for further workup. Return if worsening.

## 2024-10-25 NOTE — PROGRESS NOTES
Subjective   Patient ID: Jonel Rai is a 64 y.o. male. They present today with a chief complaint of Other (Pt. C/O tick bite to left side of abd-redness./Happened this AM.).    History of Present Illness  HPI  Pt presents with medium size tick in envelope, slightly engorged, removed from left flank. He reports showering last night and not noticing anything. However today felt a prick and redness with tick embedded in skin. [place] and has been raking leaves and has outdoor dog. Pt denies any fever.     Pt also concerned for itchy rash to right medial ankle, concerned for fungal infection. He has not tried any otc meds. He also reports hx of shingles on left upper abdomen a year ago and since has persistent itching sensation there with no rash. 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     Past Medical History  Allergies as of 10/25/2024    (No Known Allergies)       (Not in a hospital admission)       Past Medical History:   Diagnosis Date    Encounter for attention to ileostomy (Multi) 07/17/2020    Attention to ileostomy    Other conditions influencing health status     No significant past surgical history    Other retention of urine 03/11/2020    Acute urinary retention    Other specified diseases of anus and rectum 07/17/2020    Rectal mass    Retention of urine, unspecified 03/13/2020    Incomplete bladder emptying       Past Surgical History:   Procedure Laterality Date    OTHER SURGICAL HISTORY  07/17/2020    Ileostomy closure    US GUIDED NEEDLE LIVER BIOPSY  10/24/2019    US GUIDED NEEDLE LIVER BIOPSY [place]24[place]2019 [facility] [place]        reports that he has never smoked. He has never used smokeless tobacco.    Review of Systems  Review of Systems                               Objective    Vitals:    10/25/24 1458   BP: 131/81   BP Location: Left arm   Patient Position: Sitting   BP Cuff Size: Large adult   Pulse: 73   Resp: 18    Temp: 36.8 °C (98.3 °F)   TempSrc: Oral   SpO2: 95%   Weight: 92.7 kg (204 lb 5.9 oz)     No LMP for male patient.    Physical Exam  Vitals and nursing note reviewed.   Constitutional:       Appearance: Normal appearance.      Comments: Pleasant white male   Musculoskeletal:         General: Normal range of motion.      Comments: Normal gait   Skin:     General: Skin is warm and dry.      Comments: 1 cm light erythema around tick bite and darker red [place] around puncture wound on left low back flank area. 2-3 cm macular round area on medial right ankle, nontender. Superficial Varicose veins upper abdomen no rash.   Neurological:      General: No focal deficit present.      Mental Status: He is alert and oriented to person, place, and time.   Psychiatric:         Mood and Affect: Mood normal.         Procedures    Point of Care Test & Imaging Results from this visit  No results found for this visit on 10/25/24.   No results found.    Diagnostic study results (if any) were reviewed by Jacinta Sampson PA-C.    Assessment/Plan   Allergies, medications, history, and pertinent labs/EKGs/Imaging reviewed by Jacinta Sampson PA-C.     Medical Decision Making  65 yo M presents with tick bite to left flank with redness, swelling and pain x 1 day. Also with itching to left upper abdomen x 1 year and itchy rash to right medial ankle. Reviewed vitals stable, exam remarkable for puncture wound to left low back flank area with 1 cm redness, tenderness. Small erythematous round rash to medial right ankle no warmth to touch or tenderness. Discussed DDX such as tinea corporis vs contact dermatitis vs psoriasis. Discussed treatment of tick bite with Doxycycline twice a day x 10 days, take with food and probiotic. May follow up with local health department for tick testing for lyme disease. Discussed treatment of rash to right ankle with clotrimazole-betamethasone twice a day x 7 days, if not improving will need to be  seen by PCP for further workup. Return if symptoms progress.    Orders and Diagnoses  Diagnoses and all orders for this visit:  Tick bite of lower back, initial encounter  -     doxycycline (Vibra-Tabs) 100 mg tablet; Take 1 tablet (100 mg) by mouth 2 times a day for 10 days. Take with a full glass of water and do not lie down for at least 30 minutes after.  Dermatitis  -     clotrimazole-betamethasone (Lotrisone) cream; Apply 1 Application topically 2 times a day for 7 days. Apply to rash on right ankle and abdomen      Medical Admin Record      Patient disposition: Home    Electronically signed by Jacinta Sampson PA-C  3:17 PM

## 2024-11-29 DIAGNOSIS — C20 MALIGNANT NEOPLASM OF RECTUM (MULTI): Primary | ICD-10-CM

## 2024-12-02 ENCOUNTER — HOSPITAL ENCOUNTER (OUTPATIENT)
Dept: RADIOLOGY | Facility: HOSPITAL | Age: 64
Discharge: HOME | End: 2024-12-02
Payer: COMMERCIAL

## 2024-12-02 ENCOUNTER — LAB (OUTPATIENT)
Dept: LAB | Facility: HOSPITAL | Age: 64
End: 2024-12-02
Payer: COMMERCIAL

## 2024-12-02 DIAGNOSIS — C20 RECTAL CANCER (MULTI): ICD-10-CM

## 2024-12-02 LAB
ALBUMIN SERPL BCP-MCNC: 4.2 G/DL (ref 3.4–5)
ALP SERPL-CCNC: 73 U/L (ref 33–136)
ALT SERPL W P-5'-P-CCNC: 23 U/L (ref 10–52)
ANION GAP SERPL CALC-SCNC: 12 MMOL/L (ref 10–20)
AST SERPL W P-5'-P-CCNC: 26 U/L (ref 9–39)
BASOPHILS # BLD AUTO: 0.05 X10*3/UL (ref 0–0.1)
BASOPHILS NFR BLD AUTO: 1.2 %
BILIRUB SERPL-MCNC: 0.7 MG/DL (ref 0–1.2)
BUN SERPL-MCNC: 18 MG/DL (ref 6–23)
CALCIUM SERPL-MCNC: 9.1 MG/DL (ref 8.6–10.3)
CEA SERPL-MCNC: 1 UG/L
CHLORIDE SERPL-SCNC: 102 MMOL/L (ref 98–107)
CO2 SERPL-SCNC: 29 MMOL/L (ref 21–32)
CREAT SERPL-MCNC: 0.77 MG/DL (ref 0.5–1.3)
EGFRCR SERPLBLD CKD-EPI 2021: >90 ML/MIN/1.73M*2
EOSINOPHIL # BLD AUTO: 0.22 X10*3/UL (ref 0–0.7)
EOSINOPHIL NFR BLD AUTO: 5.1 %
ERYTHROCYTE [DISTWIDTH] IN BLOOD BY AUTOMATED COUNT: 12.6 % (ref 11.5–14.5)
GLUCOSE SERPL-MCNC: 94 MG/DL (ref 74–99)
HCT VFR BLD AUTO: 42.6 % (ref 41–52)
HGB BLD-MCNC: 14.1 G/DL (ref 13.5–17.5)
IMM GRANULOCYTES # BLD AUTO: 0.01 X10*3/UL (ref 0–0.7)
IMM GRANULOCYTES NFR BLD AUTO: 0.2 % (ref 0–0.9)
LYMPHOCYTES # BLD AUTO: 0.99 X10*3/UL (ref 1.2–4.8)
LYMPHOCYTES NFR BLD AUTO: 23 %
MCH RBC QN AUTO: 31.5 PG (ref 26–34)
MCHC RBC AUTO-ENTMCNC: 33.1 G/DL (ref 32–36)
MCV RBC AUTO: 95 FL (ref 80–100)
MONOCYTES # BLD AUTO: 0.63 X10*3/UL (ref 0.1–1)
MONOCYTES NFR BLD AUTO: 14.7 %
NEUTROPHILS # BLD AUTO: 2.4 X10*3/UL (ref 1.2–7.7)
NEUTROPHILS NFR BLD AUTO: 55.8 %
NRBC BLD-RTO: 0 /100 WBCS (ref 0–0)
PLATELET # BLD AUTO: 203 X10*3/UL (ref 150–450)
POTASSIUM SERPL-SCNC: 4.5 MMOL/L (ref 3.5–5.3)
PROT SERPL-MCNC: 7.2 G/DL (ref 6.4–8.2)
RBC # BLD AUTO: 4.48 X10*6/UL (ref 4.5–5.9)
SODIUM SERPL-SCNC: 138 MMOL/L (ref 136–145)
WBC # BLD AUTO: 4.3 X10*3/UL (ref 4.4–11.3)

## 2024-12-02 PROCEDURE — 74177 CT ABD & PELVIS W/CONTRAST: CPT | Performed by: RADIOLOGY

## 2024-12-02 PROCEDURE — 80053 COMPREHEN METABOLIC PANEL: CPT

## 2024-12-02 PROCEDURE — 82378 CARCINOEMBRYONIC ANTIGEN: CPT

## 2024-12-02 PROCEDURE — 74177 CT ABD & PELVIS W/CONTRAST: CPT

## 2024-12-02 PROCEDURE — 36415 COLL VENOUS BLD VENIPUNCTURE: CPT

## 2024-12-02 PROCEDURE — 2550000001 HC RX 255 CONTRASTS: Performed by: INTERNAL MEDICINE

## 2024-12-02 PROCEDURE — 85025 COMPLETE CBC W/AUTO DIFF WBC: CPT

## 2024-12-02 PROCEDURE — 71260 CT THORAX DX C+: CPT | Performed by: RADIOLOGY

## 2024-12-04 ENCOUNTER — OFFICE VISIT (OUTPATIENT)
Dept: HEMATOLOGY/ONCOLOGY | Facility: HOSPITAL | Age: 64
End: 2024-12-04
Payer: COMMERCIAL

## 2024-12-04 VITALS
SYSTOLIC BLOOD PRESSURE: 128 MMHG | HEART RATE: 58 BPM | OXYGEN SATURATION: 100 % | TEMPERATURE: 97.5 F | RESPIRATION RATE: 18 BRPM | WEIGHT: 205.91 LBS | DIASTOLIC BLOOD PRESSURE: 69 MMHG | BODY MASS INDEX: 29.28 KG/M2

## 2024-12-04 DIAGNOSIS — C20 RECTAL CANCER (MULTI): ICD-10-CM

## 2024-12-04 PROCEDURE — 1157F ADVNC CARE PLAN IN RCRD: CPT | Performed by: INTERNAL MEDICINE

## 2024-12-04 PROCEDURE — 1126F AMNT PAIN NOTED NONE PRSNT: CPT | Performed by: INTERNAL MEDICINE

## 2024-12-04 PROCEDURE — 99215 OFFICE O/P EST HI 40 MIN: CPT | Performed by: INTERNAL MEDICINE

## 2024-12-04 ASSESSMENT — ENCOUNTER SYMPTOMS
OCCASIONAL FEELINGS OF UNSTEADINESS: 0
LOSS OF SENSATION IN FEET: 1
DEPRESSION: 0

## 2024-12-04 ASSESSMENT — PAIN SCALES - GENERAL: PAINLEVEL_OUTOF10: 0-NO PAIN

## 2024-12-04 NOTE — PROGRESS NOTES
Patient ID: Jonel Rai is a 64 y.o. male.  Referring Physician: Ron Roebrtson MD  [id number] [place] [address]  Primary Care Provider: Korey Hubbard DO     To summarize his cancer Hx:  He presented to the emergency room because of  difficulty urinating and imaging on 05/22/2019 revealed an apple core circumferential rectal  mass with MRI pelvis on 05/24/2019 confirmed T4a N2 rectal cancer, extending into the sigmoid colon and into the perirectal fat with adenopathy.  05/24/2019  Chest CT showed 5 mm groundglass nodule in the right upper lobe of the lung and no other definitive evidence of metastatic disease. This was followed by Flexible sigmoidoscopy revealed an ulcerated rectal mass and biopsy revealed invasive adenocarcinoma  grade 3, pMMR.  Patient developed abdominal pain and decreased appetite and evaluation revealed obstruction therefore underwent a laparoscopic sigmoid loop colostomy. CEA on 05/2019 was 10.3. The case has been discussed on the tumor board on 06/06/2019  and pt was referred to Med Onc and Rad Onc. After that the pt saw Matt Andrew who repeated MRI on 07/23/2019 showed mid rectal mass extending above and below the anterior peritoneal reflection demonstrating invasion into the seminal vesicles bilaterally.  Dr. Vallejo discussed with the pt the stage and the extent of the disease with agreement to proceed with PATRICK (Total Neoadjuvant therapy) with Chemotherapy using XELOX (oxaliplatin and Xeloda) for 12-16 weeks then followed by concurrent Xeloda and radiation  therapy, and then surgical resection. Pt understudied the plan and he started C1 on 08/06/2019 and tolerated well. He also started ChemoRT on 11/18/2019 and tolerating well. Repeated MRI on 01/2020  for response evaluation showed Significant response to treatment compared to both prior MRI of the pelvis, most recent 10/04/2019, with interval improvement and decrease of the transmural circumferential  upper rectal mass and the LNS. On 02/27/2020 he  had Robotic low anterior resection with flexible sigmoidoscopy, loop ileostomy, colostomy reversal and Robotic dissection of the seminal vesicles and mobilization of the bladder. Final surgical pathology didn't show any residual carcinoma with final staging  T0N0 with 0/26 LNS were involved.    Subjective    Patient is feeling well. Denies abdominal pain, nausea or vomiting. No new symptoms.        Objective   BSA: 2.15 meters squared  /69 (BP Location: Left arm, Patient Position: Sitting, BP Cuff Size: Adult)   Pulse 58   Temp 36.4 °C (97.5 °F) (Temporal)   Resp 18   Wt 93.4 kg (205 lb 14.6 oz)   SpO2 100%   BMI 29.28 kg/m²       Jonel Rai  reports that he has never smoked. He has never used smokeless tobacco.  He  has no history on file for alcohol use.  He  has no history on file for drug use.    ROS:  All 14 systems have been reviewed and were negative except for the HPI.     Physical Exam:  General: Appears well and in NAD  Eyes: PERRL, EOMI, clear sclera  ENMT: mucous membranes moist, no apparent injury, no lesions seen  Head/Neck: Neck supple, no apparent injury, thyroid without mass or tenderness, No JVD, trachea midline,   Thorax: Patent airways, CTAB, normal breath sounds with good chest expansion, thorax symmetric  Cardiovascular: Regular, rate and rhythm, no murmurs, 2+ equal pulses of the extremities, normal S 1and S 2  Gastrointestinal: Nondistended, soft, non-tender, no rebound tenderness or guarding, no masses palpable, no organomegaly, +BS  Musculoskeletal: ROM intact, no joint swelling, normal strength  Extremities: normal extremities, no cyanosis edema, contusions or wounds, no clubbing  Neurological: alert and oriented x3, intact senses, motor, response and reflexes, normal strength  Lymphatic: No significant lymphadenopathy  Psychological: Appropriate mood and behavior  Skin: Warm and dry, no lesions, no rashes     Performance  Status:  Asymptomatic    Lab Results:  I have reviewed these laboratory results:     Lab Results   Component Value Date    WBC 4.3 (L) 12/02/2024    HGB 14.1 12/02/2024    HCT 42.6 12/02/2024    MCV 95 12/02/2024     12/02/2024         Chemistry    Lab Results   Component Value Date/Time     12/02/2024 0745    K 4.5 12/02/2024 0745     12/02/2024 0745    CO2 29 12/02/2024 0745    BUN 18 12/02/2024 0745    CREATININE 0.77 12/02/2024 0745    Lab Results   Component Value Date/Time    CALCIUM 9.1 12/02/2024 0745    ALKPHOS 73 12/02/2024 0745    AST 26 12/02/2024 0745    ALT 23 12/02/2024 0745    BILITOT 0.7 12/02/2024 0745            Lab Results   Component Value Date    TSH 3.77 01/31/2020        Radiology Result:  I have reviewed the latest Imaging in PACS and the findings are noted in this note. I discussed the results of the latest imaging with the patient. All previous imaging were reviewed at the time it was completed. Full records are available in the EMR for review as well.     === 11/02/23 ===    CT ABDOMEN PELVIS W IV CONTRAST    - Impression -  No evidence of acute abdominal or pelvic pathology.    Moderate to large volume of colonic stool; correlate with  constipation.    Redemonstrated postsurgical changes to the colon, with similar  appearance of abnormal extraluminal density along the left lateral  aspect of the colon proximal to the proximal aspect of the  anastomotic suture line in the pelvis.    Additional findings as discussed above.      MACRO:  None    Signed by: Bernardo Aceves 11/2/2023 3:41 AM  Dictation workstation:   [id number]    === 01/27/20 ===    MRI PELVIS W WO CONTRAST    - Impression -  1. Significant response to treatment compared to both prior MRI of  the pelvis, most recent 10/04/2019, with interval improvement and  decrease of the transmural circumferential upper rectal mass with  however persistent viable tumor in a segment of approximately 3cm  length, persistent  component of extramural invasion and concern for  persistent involvement (at least microscopic) of the peritoneal  reflection.  2. Interval decrease in size and number of at least 4 superior rectal  and perirectal lymphadenopathy compared to 10/04/2019 MRI, compatible  with stage N2.    I personally reviewed the images/study and I agree with the findings  as stated. This study was interpreted at [facility], [place], [place].           Pathology Results:  I have reviewed the full pathology report recorded in the EMR. The pertinent portions indicating diagnosis are listed here in the note. for details please refer to the full report recorded in the EMR.    Assessment and Plan:   Stage IIIC (T4aN2)  rectal cancer   64-year-old man with an initial clinical T4aN2 stage IIIc rectal cancer  S/P PATRICK  PS: ECOG score of 0  CEA was 10.3 on 05/2019 and was trending up to 38 after the colostomy  Treatment up-to-date:   PATRICK (Total Neoadjuvant  therapy) with Chemotherapy using XELOX (oxaliplatin and Xeloda) for 12-16 weeks then followed by concurrent Xeloda and radiation therapy, and then surgical resection. Pt understudied the plan and he started C1 on 08/06/2019 and he tolerated well and had  his last cycle was on 10/2019.   Biopsy of the Liver lesion on the restaging scan was negative.  He also started ChemoRT on 11/18/2019 and finished on 12/2019 tolerating well.  Repeated MRI on 01/2020 for response evaluation showed Significant response to treatment compared to both prior MRI of the pelvis, most recent 10/04/2019, with interval improvement and decrease of the transmural circumferential upper rectal mass and the  LNS. On 02/27/2020 he had Robotic low anterior resection with flexible sigmoidoscopy, loop ileostomy, colostomy reversal and Robotic dissection of the seminal vesicles and mobilization of the bladder. Final surgical pathology didn't show any residual  carcinoma with final staging  T0N0 with 0/26 LNS were involved.  Last CEA is  0.8  CT CAP for surveillance showed no evidence of metastatic or recurrent disease. The lung changes due to his COVID infection   He had his Year 1 colonscopy on 02/26/2020 which was normal   11/02/2023: CT CAP was negative  No CEA or CtDNA   12/02/2024: Restaging scans showed no evidence of disease and only stable pulmonary nodule   CEA is 1.0 ug/L     Today :  Pt is feeling well today.   Denies any abdominal pain, no fever, no chills, no nausea, no vomitting. No fatigue. No melena or hematocezia. Appetite is okay   Patient finished 5 years of surveillance         Plan:    1-No need for more surveillance and will follow up with his PCP  2-Will RTC as clinically needed       Mr. Jonel Rai ,  It was a pleasure talking to you today.    Our offices will be reaching out to you to make all the appointments. I am always available at the phone numbers listed below for an earlier appointment should you wish one.    In case of an emergency please dial 911 or report to your nearest Emergency Room.  For all other questions, please do not hesitate to reach out to us at the number listed below.    Thank you for choosing [facility].   We appreciate your visit.       Ron Robertson M.D.   and Director of the Neuroendocrine Tumor Program  Gastrointestinal Medical Oncology  Department of Hematology and Oncology  [facility], [place] [address]  Phone (Office): [phone]  Fax:  [phone]   Email: [email or web address]  Learn more about [facility] Comprehensive Neuroendocrine Tumor Program: Click Here  Learn more about [place] [employer]: [email or web address]

## 2024-12-29 ENCOUNTER — OFFICE VISIT (OUTPATIENT)
Dept: URGENT CARE | Facility: URGENT CARE | Age: 64
End: 2024-12-29
Payer: COMMERCIAL

## 2024-12-29 VITALS
TEMPERATURE: 98.4 F | OXYGEN SATURATION: 98 % | RESPIRATION RATE: 18 BRPM | HEART RATE: 83 BPM | DIASTOLIC BLOOD PRESSURE: 79 MMHG | WEIGHT: 205 LBS | SYSTOLIC BLOOD PRESSURE: 139 MMHG | BODY MASS INDEX: 29.15 KG/M2

## 2024-12-29 DIAGNOSIS — J39.9 UPPER RESPIRATORY DISEASE: ICD-10-CM

## 2024-12-29 DIAGNOSIS — L85.3 DRY SKIN DERMATITIS: Primary | ICD-10-CM

## 2024-12-29 DIAGNOSIS — R09.89 CHEST CONGESTION: ICD-10-CM

## 2024-12-29 DIAGNOSIS — R05.9 COUGH, UNSPECIFIED TYPE: ICD-10-CM

## 2024-12-29 LAB
POC RAPID INFLUENZA A: NEGATIVE
POC RAPID INFLUENZA B: NEGATIVE
POC SARS-COV-2 AG BINAX: NORMAL

## 2024-12-29 PROCEDURE — 1157F ADVNC CARE PLAN IN RCRD: CPT | Performed by: PHYSICIAN ASSISTANT

## 2024-12-29 PROCEDURE — 99213 OFFICE O/P EST LOW 20 MIN: CPT | Performed by: PHYSICIAN ASSISTANT

## 2024-12-29 PROCEDURE — 1160F RVW MEDS BY RX/DR IN RCRD: CPT | Performed by: PHYSICIAN ASSISTANT

## 2024-12-29 PROCEDURE — 87804 INFLUENZA ASSAY W/OPTIC: CPT | Performed by: PHYSICIAN ASSISTANT

## 2024-12-29 PROCEDURE — 1159F MED LIST DOCD IN RCRD: CPT | Performed by: PHYSICIAN ASSISTANT

## 2024-12-29 PROCEDURE — 1036F TOBACCO NON-USER: CPT | Performed by: PHYSICIAN ASSISTANT

## 2024-12-29 PROCEDURE — 87811 SARS-COV-2 COVID19 W/OPTIC: CPT | Performed by: PHYSICIAN ASSISTANT

## 2024-12-29 RX ORDER — AMMONIUM LACTATE 12 G/100G
CREAM TOPICAL
COMMUNITY
Start: 2024-08-06

## 2024-12-29 RX ORDER — PENICILLIN V POTASSIUM 500 MG/1
500 TABLET, FILM COATED ORAL 3 TIMES DAILY
Qty: 30 TABLET | Refills: 0 | Status: SHIPPED | OUTPATIENT
Start: 2024-12-29 | End: 2025-01-08

## 2024-12-29 RX ORDER — AMMONIUM LACTATE 12 G/100G
LOTION TOPICAL AS NEEDED
Qty: 225 G | Refills: 3 | Status: SHIPPED | OUTPATIENT
Start: 2024-12-29 | End: 2025-03-29

## 2024-12-29 RX ORDER — AMOXICILLIN AND CLAVULANATE POTASSIUM 875; 125 MG/1; MG/1
875 TABLET, FILM COATED ORAL 2 TIMES DAILY
Qty: 20 TABLET | Refills: 0 | Status: SHIPPED | OUTPATIENT
Start: 2024-12-29 | End: 2024-12-29 | Stop reason: SINTOL

## 2024-12-29 RX ORDER — KETOCONAZOLE 20 MG/G
CREAM TOPICAL 2 TIMES DAILY
COMMUNITY
Start: 2024-12-04

## 2024-12-29 ASSESSMENT — ENCOUNTER SYMPTOMS
SINUS PAIN: 1
HEADACHES: 1
COUGH: 1

## 2024-12-29 NOTE — PROGRESS NOTES
Subjective   Patient ID: Jonel Rai is a 64 y.o. male. They present today with a chief complaint of Cough, Nasal Congestion, Headache, and Generalized Body Aches (X 3 days, daughter was diagnosed with the flu).    With history of remote rectal cancer but otherwise denies any other past medical history  Has had upper respiratory symptoms as stated above for the last 3 to 4 days daughter was here over the Christmas holiday and she tested positive for influenza A patient denies any shortness of breath no fevers positive chills no lightheaded dizziness no chest pain no abdominal pain no nausea vomiting or diarrhea    Also complaints of dry cracked feet his dermatologist had been prescribed Lac-Hydrin that worked on them but his dermatologist has since retired and he has been out of the medication he has tried other over-the-counter medications without improvement  History of Present Illness    Cough  Associated symptoms include headaches and postnasal drip.   Headache  Associated symptoms: congestion, cough and drainage        Past Medical History  Allergies as of 12/29/2024 - Reviewed 12/29/2024   Allergen Reaction Noted    Lactose GI Upset 05/31/2018       (Not in a hospital admission)       Past Medical History:   Diagnosis Date    Encounter for attention to ileostomy 07/17/2020    Attention to ileostomy    Other conditions influencing health status     No significant past surgical history    Other retention of urine 03/11/2020    Acute urinary retention    Other specified diseases of anus and rectum 07/17/2020    Rectal mass    Retention of urine, unspecified 03/13/2020    Incomplete bladder emptying       Past Surgical History:   Procedure Laterality Date    OTHER SURGICAL HISTORY  07/17/2020    Ileostomy closure    US GUIDED NEEDLE LIVER BIOPSY  10/24/2019    US GUIDED NEEDLE LIVER BIOPSY 10/24/2019 [facility]        reports that he has never smoked. He has never used smokeless tobacco.    Review of  Systems  Review of Systems   HENT:  Positive for congestion, postnasal drip and sinus pain.    Respiratory:  Positive for cough.    Skin:         Also complaints of dry cracked feet history of the same in the past his dermatologist had been prescribed him Lac-Hydrin    But his dermatologist has since retired and he is out of the medication denies any new symptoms   Neurological:  Positive for headaches.                                  Objective    Vitals:    12/29/24 1038   BP: 139/79   BP Location: Left arm   Patient Position: Sitting   BP Cuff Size: Adult   Pulse: 83   Resp: 18   Temp: 36.9 °C (98.4 °F)   TempSrc: Oral   SpO2: 98%   Weight: 93 kg (205 lb)     No LMP for male patient.    Physical Exam  Vitals and nursing note reviewed.   Constitutional:       Appearance: Normal appearance.   HENT:      Head: Normocephalic and atraumatic.      Right Ear: Tympanic membrane, ear canal and external ear normal.      Left Ear: Tympanic membrane and ear canal normal.      Nose: Congestion and rhinorrhea present.      Mouth/Throat:      Mouth: Mucous membranes are moist.   Eyes:      Pupils: Pupils are equal, round, and reactive to light.   Cardiovascular:      Rate and Rhythm: Normal rate and regular rhythm.   Pulmonary:      Effort: Pulmonary effort is normal.      Breath sounds: Normal breath sounds.   Abdominal:      Palpations: Abdomen is soft.   Musculoskeletal:         General: Normal range of motion.      Cervical back: Normal range of motion and neck supple.   Skin:     Capillary Refill: Capillary refill takes less than 2 seconds.      Comments: Dry skin with callus bilateral plantar surface of the feet  No discharge erythema or other open areas noted   Neurological:      General: No focal deficit present.      Mental Status: He is alert and oriented to person, place, and time.         Procedures    Point of Care Test & Imaging Results from this visit  Results for orders placed or performed in visit on 12/29/24    POCT Covid-19 Rapid Antigen   Result Value Ref Range    POC JOAO-COV-2 AG  Presumptive negative test for SARS-CoV-2 (no antigen detected)     Presumptive negative test for SARS-CoV-2 (no antigen detected)   POCT Influenza A/B manually resulted   Result Value Ref Range    POC Rapid Influenza A Negative Negative    POC Rapid Influenza B Negative Negative      No results found.    Diagnostic study results (if any) were reviewed by Taylor Francisco PA-C.    Assessment/Plan   Allergies, medications, history, and pertinent labs/EKGs/Imaging reviewed by Taylor Francisco PA-C.     Medical Decision Making  Differential:   1) influenza   2) viral URI   3) dermatitis/dry skin  64-year-old male with remote history in the past of rectal cancer over 5 years ago had family out for the [place] holiday his daughter started getting sick after she had left to go back home she was seen and treated for influenza A patient having symptoms over the last 3 to 4 days cough congestion postnasal drip COVID and flu here were both negative will treat as a viral URI patient also was requesting medication for his dry skin on his feet had been using Lac-Hydrin by his dermatologist in the past, prescribed Lac-Hydrin did prescribe antibiotic but told patient currently is upper respiratory is due to viral if his symptoms get worse if they go on greater than 8 days or if other symptoms develop he needs to get rechecked patient verbalized understanding discharged home     Plan: Discussed differential with the patient and /or parents family   Patient to  follow up with the PCP in the next 2-3 days  Return for any worsening symptoms or go to the ER for further evaluation. Patient/family/caregiver  understands return   precautions and discharge instructions and is agreeable to the current plan   Impression:        Orders and Diagnoses for this visit    Orders and Diagnoses  Diagnoses and all orders for this visit:  Dry skin dermatitis  -     ammonium  lactate (AmLactin) 12 % lotion; Apply topically if needed for dry skin.  Cough, unspecified type  -     POCT Covid-19 Rapid Antigen  -     POCT Influenza A/B manually resulted  Chest congestion  -     POCT Covid-19 Rapid Antigen  -     POCT Influenza A/B manually resulted  Upper respiratory disease  -     penicillin v potassium (Veetid) 500 mg tablet; Take 1 tablet (500 mg) by mouth 3 times a day for 10 days.      Medical Admin Record      Patient disposition: Home    Electronically signed by Taylor Francisco PA-C  11:05 AM